# Patient Record
Sex: FEMALE | Race: WHITE | Employment: OTHER | URBAN - METROPOLITAN AREA
[De-identification: names, ages, dates, MRNs, and addresses within clinical notes are randomized per-mention and may not be internally consistent; named-entity substitution may affect disease eponyms.]

---

## 2019-05-08 ENCOUNTER — OFFICE VISIT (OUTPATIENT)
Dept: GASTROENTEROLOGY | Age: 84
End: 2019-05-08
Payer: MEDICARE

## 2019-05-08 VITALS
WEIGHT: 112 LBS | BODY MASS INDEX: 21.14 KG/M2 | SYSTOLIC BLOOD PRESSURE: 147 MMHG | HEART RATE: 81 BPM | DIASTOLIC BLOOD PRESSURE: 90 MMHG | HEIGHT: 61 IN

## 2019-05-08 DIAGNOSIS — R19.7 DIARRHEA, UNSPECIFIED TYPE: Primary | ICD-10-CM

## 2019-05-08 PROCEDURE — 99204 OFFICE O/P NEW MOD 45 MIN: CPT | Performed by: INTERNAL MEDICINE

## 2019-05-08 RX ORDER — POLYETHYLENE GLYCOL 3350 17 G/17G
POWDER, FOR SOLUTION ORAL
Qty: 255 G | Refills: 0 | Status: SHIPPED | OUTPATIENT
Start: 2019-05-08 | End: 2019-06-07

## 2019-05-08 ASSESSMENT — ENCOUNTER SYMPTOMS
DIARRHEA: 1
SINUS PAIN: 0
EYE REDNESS: 0
CHEST TIGHTNESS: 0
ABDOMINAL PAIN: 0
TROUBLE SWALLOWING: 0
BLOOD IN STOOL: 0
ABDOMINAL DISTENTION: 0
CONSTIPATION: 0
BACK PAIN: 1
SINUS PRESSURE: 0
RECTAL PAIN: 0
EYE PAIN: 0
ANAL BLEEDING: 0
WHEEZING: 0
SHORTNESS OF BREATH: 1
VOMITING: 0
NAUSEA: 0

## 2019-05-08 NOTE — PROGRESS NOTES
Subjective:      Patient ID: Miguel Ontiveros is a 81 y.o. female. HPI     Dr. Alice Gutierrez DO has requested that I see Miguel Ontiveros for a consult for No diagnosis found. .  Patient seen with the symptom of diarrhea. Patient states that she has this symptom since November 2018. She has as many as 4-5 bowel movements a day. On occasion she does have nocturnal bowel movements. Denies hematochezia. Moderate amount, semi-formed to liquid consistency. Sometimes she has urgency of bowel movements in the postprandial state. Denies food allergies. No symptoms of lactose intolerance. She has minimal weight loss in the last couple of months about 5 pounds. She feels weak tired. She never had similar symptoms of diarrhea in the past.     Has abdominal cramps especially in the pelvic area. Has a tenesmus. She has a fair appetite. No dysphagia. No dyspeptic symptoms. Denies taking NSAIDs. Past Medical, Family, and Social History reviewed and does contribute to the patient presenting condition. patient\"s PMH/PSH,SH,PSYCH hx, MEDs, ALLERGIES, and ROS was all reviewed and updated ion the appropriate sections        Review of Systems   Constitutional: Negative for appetite change, fatigue and unexpected weight change. HENT: Negative for sinus pressure, sinus pain and trouble swallowing. Eyes: Positive for visual disturbance (glasses). Negative for pain and redness. Respiratory: Positive for shortness of breath. Negative for chest tightness and wheezing. Cardiovascular: Negative for chest pain, palpitations and leg swelling. Gastrointestinal: Positive for diarrhea. Negative for abdominal distention, abdominal pain, anal bleeding, blood in stool, constipation, nausea, rectal pain and vomiting. Endocrine: Negative for cold intolerance and heat intolerance. Genitourinary: Negative for difficulty urinating, frequency and urgency.    Musculoskeletal: Positive for back pain and neck Given persistent symptoms need to evaluate possibility of colitis especially microscopic colitis. Other etiologies includes malabsorption syndromes discussed with patient. After discussion patient requests investigations. Advised workup as outlined above. Also discussed regarding colonoscopy procedure, preparation, risks and benefits. After discussion patient understood and verbalized consent.

## 2019-05-17 ENCOUNTER — TELEPHONE (OUTPATIENT)
Dept: GASTROENTEROLOGY | Age: 84
End: 2019-05-17

## 2019-06-19 ENCOUNTER — OFFICE VISIT (OUTPATIENT)
Dept: GASTROENTEROLOGY | Age: 84
End: 2019-06-19
Payer: MEDICARE

## 2019-06-19 VITALS
HEART RATE: 72 BPM | DIASTOLIC BLOOD PRESSURE: 76 MMHG | SYSTOLIC BLOOD PRESSURE: 157 MMHG | WEIGHT: 114.6 LBS | BODY MASS INDEX: 21.65 KG/M2

## 2019-06-19 DIAGNOSIS — R19.7 DIARRHEA, UNSPECIFIED TYPE: Primary | ICD-10-CM

## 2019-06-19 PROCEDURE — 1090F PRES/ABSN URINE INCON ASSESS: CPT | Performed by: INTERNAL MEDICINE

## 2019-06-19 PROCEDURE — 99213 OFFICE O/P EST LOW 20 MIN: CPT | Performed by: INTERNAL MEDICINE

## 2019-06-19 PROCEDURE — 4040F PNEUMOC VAC/ADMIN/RCVD: CPT | Performed by: INTERNAL MEDICINE

## 2019-06-19 PROCEDURE — G8427 DOCREV CUR MEDS BY ELIG CLIN: HCPCS | Performed by: INTERNAL MEDICINE

## 2019-06-19 PROCEDURE — 1036F TOBACCO NON-USER: CPT | Performed by: INTERNAL MEDICINE

## 2019-06-19 PROCEDURE — G8420 CALC BMI NORM PARAMETERS: HCPCS | Performed by: INTERNAL MEDICINE

## 2019-06-19 PROCEDURE — G8400 PT W/DXA NO RESULTS DOC: HCPCS | Performed by: INTERNAL MEDICINE

## 2019-06-19 PROCEDURE — 1123F ACP DISCUSS/DSCN MKR DOCD: CPT | Performed by: INTERNAL MEDICINE

## 2019-06-19 ASSESSMENT — ENCOUNTER SYMPTOMS
DIARRHEA: 1
CHOKING: 0
CONSTIPATION: 0
BLOOD IN STOOL: 0
ANAL BLEEDING: 0
TROUBLE SWALLOWING: 0
VOICE CHANGE: 0
BACK PAIN: 0
WHEEZING: 0
SINUS PRESSURE: 0
NAUSEA: 0
ABDOMINAL DISTENTION: 0
RESPIRATORY NEGATIVE: 1
VOMITING: 0
ABDOMINAL PAIN: 0
ALLERGIC/IMMUNOLOGIC NEGATIVE: 1
SORE THROAT: 0
COUGH: 0
RECTAL PAIN: 0

## 2019-06-19 NOTE — PROGRESS NOTES
Subjective:      Patient ID: Shayy Wolf is a 81 y.o. female. Dr. Camelia Pierce, DO our mutual patient Shayy Wolf was seen  for   1. Diarrhea, unspecified type     . Patient seen along with APRN. History discussed. Colonoscopy findings explained to the patient. Random biopsies did not reveal microscopic colitis. At present patient has a couple of bowel movements a day. No weight loss. Past Medical, Family, and Social History reviewed and does contribute to the patient presenting condition. patient\"s PMH/PSH,SH,PSYCH hx, MEDs, ALLERGIES, and ROS was all reviewed and updated ion the appropriate sections      HPI     Pt presents for follow-up s/p colonoscopy. Pt was having frequent diarrhea. Her labs were unremarkable. Her colonoscopy revealed 2 polyps, tubular adenomas. Pt is having 1-2 bowel movements daily now, soft, and formed. No nocturnal bowel movements. Denies hematochezia or melena. No cramping, bloating, nausea, vomiting, abdominal pain. Has occasional urgency of bowels. Denies dysphagia, odynophagia, or dyspepsia. Has good appetite, no recent weight loss/gain. Denies any NSAID use. Review of Systems   Constitutional: Negative. Negative for appetite change, fatigue and unexpected weight change. HENT: Negative. Negative for dental problem, postnasal drip, sinus pressure, sore throat, trouble swallowing and voice change. Eyes: Positive for visual disturbance (reading glasses). Respiratory: Negative. Negative for cough, choking and wheezing. Cardiovascular: Negative. Negative for chest pain, palpitations and leg swelling. Gastrointestinal: Positive for diarrhea. Negative for abdominal distention, abdominal pain, anal bleeding, blood in stool, constipation, nausea, rectal pain and vomiting. Genitourinary: Negative. Negative for difficulty urinating. Musculoskeletal: Negative. Negative for arthralgias, back pain, gait problem and myalgias. Allergic/Immunologic: Negative. Negative for environmental allergies and food allergies. Neurological: Negative. Negative for dizziness, weakness, light-headedness, numbness and headaches. Hematological: Bruises/bleeds easily (patient states she takes baby aspirin). Psychiatric/Behavioral: Negative. Negative for sleep disturbance. The patient is not nervous/anxious. Objective:   Physical Exam   Constitutional: She is oriented to person, place, and time. She appears well-developed and well-nourished. Thinly built   HENT:   Head: Normocephalic. Neck: Normal range of motion. Cardiovascular: Normal rate, regular rhythm and normal heart sounds. Pulmonary/Chest: Effort normal and breath sounds normal.   Abdominal: Soft. Bowel sounds are normal. She exhibits no distension. There is no tenderness. There is no guarding. Musculoskeletal: Normal range of motion. Neurological: She is alert and oriented to person, place, and time. Skin: Skin is warm and dry. Psychiatric: She has a normal mood and affect. Her behavior is normal. Thought content normal.       Assessment:       Diagnosis Orders   1. Diarrhea, unspecified type          Plan:      Pt is overall doing very well. She has improvement in bowel function. If having more than 3 loose bowel movements a day, may take imodium. Not to exceed 3 in 1 day. Her labs were reviewed in great detail. Above plan discussed with APRN and arranged. We will see her intermittently if she has concerns or issues. Repeat colonoscopy in 3 years. I have reviewed and agree with the ROS entered by the MA.